# Patient Record
Sex: FEMALE | Race: WHITE | HISPANIC OR LATINO
[De-identification: names, ages, dates, MRNs, and addresses within clinical notes are randomized per-mention and may not be internally consistent; named-entity substitution may affect disease eponyms.]

---

## 2021-06-14 ENCOUNTER — APPOINTMENT (OUTPATIENT)
Dept: OBGYN | Facility: CLINIC | Age: 26
End: 2021-06-14
Payer: COMMERCIAL

## 2021-06-14 VITALS
SYSTOLIC BLOOD PRESSURE: 90 MMHG | DIASTOLIC BLOOD PRESSURE: 60 MMHG | WEIGHT: 125 LBS | TEMPERATURE: 97.7 F | HEIGHT: 61 IN | BODY MASS INDEX: 23.6 KG/M2

## 2021-06-14 DIAGNOSIS — Z11.59 ENCOUNTER FOR SCREENING FOR OTHER VIRAL DISEASES: ICD-10-CM

## 2021-06-14 DIAGNOSIS — Z30.018 ENCOUNTER FOR INITIAL PRESCRIPTION OF OTHER CONTRACEPTIVES: ICD-10-CM

## 2021-06-14 DIAGNOSIS — F90.1 ATTENTION-DEFICIT HYPERACTIVITY DISORDER, PREDOMINANTLY HYPERACTIVE TYPE: ICD-10-CM

## 2021-06-14 DIAGNOSIS — Z86.59 PERSONAL HISTORY OF OTHER MENTAL AND BEHAVIORAL DISORDERS: ICD-10-CM

## 2021-06-14 DIAGNOSIS — Z83.3 FAMILY HISTORY OF DIABETES MELLITUS: ICD-10-CM

## 2021-06-14 DIAGNOSIS — Z87.19 PERSONAL HISTORY OF OTHER DISEASES OF THE DIGESTIVE SYSTEM: ICD-10-CM

## 2021-06-14 DIAGNOSIS — Z11.4 ENCOUNTER FOR SCREENING FOR HUMAN IMMUNODEFICIENCY VIRUS [HIV]: ICD-10-CM

## 2021-06-14 DIAGNOSIS — Z82.49 FAMILY HISTORY OF ISCHEMIC HEART DISEASE AND OTHER DISEASES OF THE CIRCULATORY SYSTEM: ICD-10-CM

## 2021-06-14 DIAGNOSIS — Z78.9 OTHER SPECIFIED HEALTH STATUS: ICD-10-CM

## 2021-06-14 PROBLEM — Z00.00 ENCOUNTER FOR PREVENTIVE HEALTH EXAMINATION: Status: ACTIVE | Noted: 2021-06-14

## 2021-06-14 PROCEDURE — 36415 COLL VENOUS BLD VENIPUNCTURE: CPT

## 2021-06-14 PROCEDURE — 99385 PREV VISIT NEW AGE 18-39: CPT

## 2021-06-14 NOTE — HISTORY OF PRESENT ILLNESS
[Patient reported PAP Smear was normal] : Patient reported PAP Smear was normal [N] : Patient reports normal menses [Intravaginal Ring] : uses the intravaginal ring [Monogamous (Male Partner)] : is monogamous with a male partner [Y] : Positive pregnancy history [Menarche Age: ____] : age at menarche was [unfilled] [No] : Patient does not have concerns regarding sex [Currently Active] : currently active [Men] : men [PapSmeardate] : 1/2019 [LMPDate] : 6/04/21 [PGxTotal] : 1 [facility] [PGHxABInduced] : 1 [FreeTextEntry1] : 6/04/21

## 2021-06-14 NOTE — DISCUSSION/SUMMARY
[FreeTextEntry1] : PAP/GC Chlam/STD panel\par \par Safe sexual practices reviewed\par \par Reviewed self breast exam\par \par Discussed vulvar hygiene, advised pt to stop using Vagisil\par \par Nuva ring refilled

## 2021-06-14 NOTE — PHYSICAL EXAM
[Appropriately responsive] : appropriately responsive [Alert] : alert [No Acute Distress] : no acute distress [No Lymphadenopathy] : no lymphadenopathy [Soft] : soft [Non-tender] : non-tender [Non-distended] : non-distended [No Lesions] : no lesions [No Mass] : no mass [FreeTextEntry6] : B/L nipple piercings [Examination Of The Breasts] : a normal appearance [No Masses] : no breast masses were palpable [Labia Majora] : normal [Labia Minora] : normal [Normal] : normal [Uterine Adnexae] : normal

## 2021-06-28 ENCOUNTER — TRANSCRIPTION ENCOUNTER (OUTPATIENT)
Age: 26
End: 2021-06-28

## 2021-08-09 ENCOUNTER — TRANSCRIPTION ENCOUNTER (OUTPATIENT)
Age: 26
End: 2021-08-09

## 2021-08-09 LAB
C TRACH RRNA SPEC QL NAA+PROBE: NOT DETECTED
CYTOLOGY CVX/VAG DOC THIN PREP: ABNORMAL
HAV IGM SER QL: NONREACTIVE
HBV CORE IGM SER QL: NONREACTIVE
HBV SURFACE AG SER QL: NONREACTIVE
HCV AB SER QL: NONREACTIVE
HCV S/CO RATIO: 0.1 S/CO
HIV1+2 AB SPEC QL IA.RAPID: NONREACTIVE
HSV 1+2 IGG SER IA-IMP: NEGATIVE
HSV 1+2 IGG SER IA-IMP: POSITIVE
HSV1 IGG SER QL: 2.98 INDEX
HSV2 IGG SER QL: 0.08 INDEX
N GONORRHOEA RRNA SPEC QL NAA+PROBE: NOT DETECTED
SOURCE TP AMPLIFICATION: NORMAL
T PALLIDUM AB SER QL IA: NEGATIVE

## 2021-09-14 ENCOUNTER — NON-APPOINTMENT (OUTPATIENT)
Age: 26
End: 2021-09-14

## 2021-12-15 RX ORDER — ETONOGESTREL AND ETHINYL ESTRADIOL .12; .015 MG/D; MG/D
0.12-0.015 RING VAGINAL
Qty: 3 | Refills: 9 | Status: DISCONTINUED | COMMUNITY
Start: 2021-03-05 | End: 2021-12-15

## 2022-06-01 ENCOUNTER — APPOINTMENT (OUTPATIENT)
Dept: OBGYN | Facility: CLINIC | Age: 27
End: 2022-06-01

## 2022-10-10 ENCOUNTER — NON-APPOINTMENT (OUTPATIENT)
Age: 27
End: 2022-10-10

## 2022-11-27 ENCOUNTER — NON-APPOINTMENT (OUTPATIENT)
Age: 27
End: 2022-11-27

## 2022-12-17 ENCOUNTER — NON-APPOINTMENT (OUTPATIENT)
Age: 27
End: 2022-12-17

## 2022-12-17 ENCOUNTER — APPOINTMENT (OUTPATIENT)
Dept: OBGYN | Facility: CLINIC | Age: 27
End: 2022-12-17
Payer: SELF-PAY

## 2022-12-17 VITALS
DIASTOLIC BLOOD PRESSURE: 64 MMHG | BODY MASS INDEX: 23.79 KG/M2 | WEIGHT: 126 LBS | SYSTOLIC BLOOD PRESSURE: 92 MMHG | HEIGHT: 61 IN

## 2022-12-17 DIAGNOSIS — Z80.0 FAMILY HISTORY OF MALIGNANT NEOPLASM OF DIGESTIVE ORGANS: ICD-10-CM

## 2022-12-17 DIAGNOSIS — Z80.3 FAMILY HISTORY OF MALIGNANT NEOPLASM OF BREAST: ICD-10-CM

## 2022-12-17 DIAGNOSIS — Z12.4 ENCOUNTER FOR SCREENING FOR MALIGNANT NEOPLASM OF CERVIX: ICD-10-CM

## 2022-12-17 DIAGNOSIS — Z82.0 FAMILY HISTORY OF EPILEPSY AND OTHER DISEASES OF THE NERVOUS SYSTEM: ICD-10-CM

## 2022-12-17 LAB
HCG UR QL: NEGATIVE
QUALITY CONTROL: YES

## 2022-12-17 PROCEDURE — 81025 URINE PREGNANCY TEST: CPT

## 2022-12-17 PROCEDURE — 99395 PREV VISIT EST AGE 18-39: CPT

## 2022-12-17 RX ORDER — ESCITALOPRAM OXALATE 10 MG/1
10 TABLET ORAL
Qty: 30 | Refills: 0 | Status: DISCONTINUED | COMMUNITY
Start: 2021-02-05 | End: 2022-12-17

## 2022-12-17 RX ORDER — DEXTROAMPHETAMINE SACCHARATE, AMPHETAMINE ASPARTATE, DEXTROAMPHETAMINE SULFATE AND AMPHETAMINE SULFATE 5; 5; 5; 5 MG/1; MG/1; MG/1; MG/1
20 TABLET ORAL
Qty: 30 | Refills: 0 | Status: DISCONTINUED | COMMUNITY
Start: 2021-05-21 | End: 2022-12-17

## 2022-12-17 RX ORDER — DEXTROAMPHETAMINE SACCHARATE, AMPHETAMINE ASPARTATE, DEXTROAMPHETAMINE SULFATE, AND AMPHETAMINE SULFATE 7.5; 7.5; 7.5; 7.5 MG/1; MG/1; MG/1; MG/1
30 TABLET ORAL
Refills: 0 | Status: ACTIVE | COMMUNITY

## 2022-12-17 RX ORDER — BENZONATATE 200 MG/1
200 CAPSULE ORAL
Qty: 30 | Refills: 0 | Status: DISCONTINUED | COMMUNITY
Start: 2020-12-22 | End: 2022-12-17

## 2022-12-17 RX ORDER — DEXTROAMPHETAMINE SACCHARATE, AMPHETAMINE ASPARTATE, DEXTROAMPHETAMINE SULFATE AND AMPHETAMINE SULFATE 3.75; 3.75; 3.75; 3.75 MG/1; MG/1; MG/1; MG/1
15 TABLET ORAL
Qty: 30 | Refills: 0 | Status: DISCONTINUED | COMMUNITY
Start: 2021-04-16 | End: 2022-12-17

## 2022-12-17 RX ORDER — BUPROPION HYDROCHLORIDE 150 MG/1
150 TABLET, EXTENDED RELEASE ORAL
Qty: 30 | Refills: 0 | Status: DISCONTINUED | COMMUNITY
Start: 2021-04-27 | End: 2022-12-17

## 2022-12-19 LAB
C TRACH RRNA SPEC QL NAA+PROBE: NOT DETECTED
N GONORRHOEA RRNA SPEC QL NAA+PROBE: NOT DETECTED
SOURCE TP AMPLIFICATION: NORMAL

## 2022-12-20 ENCOUNTER — NON-APPOINTMENT (OUTPATIENT)
Age: 27
End: 2022-12-20

## 2022-12-21 ENCOUNTER — NON-APPOINTMENT (OUTPATIENT)
Age: 27
End: 2022-12-21

## 2022-12-21 RX ORDER — ETONOGESTREL AND ETHINYL ESTRADIOL .12; .015 MG/D; MG/D
0.12-0.015 INSERT, EXTENDED RELEASE VAGINAL
Qty: 1 | Refills: 3 | Status: ACTIVE | COMMUNITY
Start: 2021-12-15 | End: 1900-01-01

## 2022-12-28 NOTE — HISTORY OF PRESENT ILLNESS
[N] : Patient reports normal menses [Intravaginal Ring] : uses the intravaginal ring [Y] : Positive pregnancy history [Menarche Age: ____] : age at menarche was [unfilled] [No] : Patient does not have concerns regarding sex [TextBox_4] : romero is here for an annual exam.\par \par She has questions about genetic testing given her family history of breast cancer on her maternal side and colon cancer on her paternal side. She has polyps herself and is recommended to have a yearly colonoscopy.\par \par She is using the nuvaring and doing well- tries to use it continuously to skip her period, but had btb the entire month last month.  [Papeardate] : 06/14/21 [TextBox_31] : NEG [ColonoscopyDate] : 08/2022 [TextBox_43] : PER PT POLYPS SEEN, DUE TO GO BACK YEARLY [GonorrheaDate] : 06/14/21 [TextBox_63] : NEG [ChlamydiaDate] : 06/14/21 [TextBox_68] : NEG [LMPDate] : 12/13/22 [PGxTotal] : 1 [[facility] [PGHxABInduced] : 1 [FreeTextEntry1] : 12/13/22

## 2022-12-28 NOTE — PHYSICAL EXAM
[Appropriately responsive] : appropriately responsive [Alert] : alert [No Acute Distress] : no acute distress [Soft] : soft [Non-tender] : non-tender [Non-distended] : non-distended [No Lesions] : no lesions [No Mass] : no mass [Oriented x3] : oriented x3 [Examination Of The Breasts] : a normal appearance [No Masses] : no breast masses were palpable [Normal] : normal [Uterine Adnexae] : non-palpable [Tenderness] : nontender [Enlarged ___ wks] : not enlarged [Mass ___ cm] : no uterine mass was palpated

## 2022-12-28 NOTE — PLAN
[FreeTextEntry1] : - genetic testing recommended due to family history for breast, ovarian and colon cancer\par - pap obtained\par - continue nuvaring, recommended having a withdrawl bleed periodically to reduce btb with continuous use.\par - self breast exams/breast awareness encouraged

## 2023-01-02 LAB — CYTOLOGY CVX/VAG DOC THIN PREP: NORMAL

## 2023-01-11 ENCOUNTER — APPOINTMENT (OUTPATIENT)
Dept: OBGYN | Facility: CLINIC | Age: 28
End: 2023-01-11
Payer: COMMERCIAL

## 2023-01-11 VITALS
SYSTOLIC BLOOD PRESSURE: 110 MMHG | HEIGHT: 61 IN | WEIGHT: 126 LBS | BODY MASS INDEX: 23.79 KG/M2 | TEMPERATURE: 97 F | DIASTOLIC BLOOD PRESSURE: 70 MMHG

## 2023-01-11 DIAGNOSIS — Z80.49 FAMILY HISTORY OF MALIGNANT NEOPLASM OF OTHER GENITAL ORGANS: ICD-10-CM

## 2023-01-11 DIAGNOSIS — Z80.0 FAMILY HISTORY OF MALIGNANT NEOPLASM OF DIGESTIVE ORGANS: ICD-10-CM

## 2023-01-11 DIAGNOSIS — Z13.79 ENCOUNTER FOR OTHER SCREENING FOR GENETIC AND CHROMOSOMAL ANOMALIES: ICD-10-CM

## 2023-01-11 DIAGNOSIS — Z80.41 FAMILY HISTORY OF MALIGNANT NEOPLASM OF OVARY: ICD-10-CM

## 2023-01-11 PROCEDURE — 36415 COLL VENOUS BLD VENIPUNCTURE: CPT

## 2023-01-11 PROCEDURE — 99213 OFFICE O/P EST LOW 20 MIN: CPT

## 2023-01-13 ENCOUNTER — TRANSCRIPTION ENCOUNTER (OUTPATIENT)
Age: 28
End: 2023-01-13

## 2023-01-13 PROBLEM — Z80.41 FAMILY HISTORY OF MALIGNANT NEOPLASM OF OVARY: Status: ACTIVE | Noted: 2021-06-14

## 2023-01-13 LAB
HSV 1+2 IGG SER IA-IMP: POSITIVE
HSV 1+2 IGG SER IA-IMP: POSITIVE
HSV1 IGG SER QL: 8.95 INDEX
HSV2 IGG SER QL: 3.56 INDEX

## 2023-01-13 NOTE — PLAN
[FreeTextEntry1] : - we updated her family history\par - she is interested in having the comprehensive genetic panel completed today- labs drawn in office\par - we discussed prior + HSV serology testing- will repeat today.  \par - pt to follow up in office in 2 weeks for results of above testing.\par \par \par ***Addendum- pt send me a portal message after her visit stating her mother and aunt ARE BRCA positive.  Chart updated.

## 2023-01-13 NOTE — PHYSICAL EXAM
[Appropriately responsive] : appropriately responsive [Alert] : alert [No Acute Distress] : no acute distress [Oriented x3] : oriented x3 [No Bleeding] : There was no active vaginal bleeding

## 2023-01-13 NOTE — HISTORY OF PRESENT ILLNESS
[Intravaginal Ring] : uses the intravaginal ring [Y] : Positive pregnancy history [Menarche Age: ____] : age at menarche was [unfilled] [No] : Patient does not have concerns regarding sex [Currently Active] : currently active [TextBox_4] : Yoselyn is here to have genetic testing completed relating to her family history of cancer.  \par she has multiple family members with cancer.  She states her mother tested negative for the BRCA mutation.\par \par **Addendum- pt messaged via the portal and discovered her mother and aunt DO have a BRCA mutation** [PapSmeardate] : 12/17/22 [TextBox_31] : NEG [GonorrheaDate] : 12/17/22 [TextBox_63] : NEG [ChlamydiaDate] : 12/17/22 [TextBox_68] : NEG [LMPDate] : 01/07/22 [PGxTotal] : 1 [[facility] [PGHxABInduced] : 1 [FreeTextEntry1] : 01/07/22

## 2023-01-14 ENCOUNTER — TRANSCRIPTION ENCOUNTER (OUTPATIENT)
Age: 28
End: 2023-01-14

## 2023-01-25 ENCOUNTER — APPOINTMENT (OUTPATIENT)
Dept: OBGYN | Facility: CLINIC | Age: 28
End: 2023-01-25
Payer: COMMERCIAL

## 2023-01-25 VITALS
SYSTOLIC BLOOD PRESSURE: 110 MMHG | DIASTOLIC BLOOD PRESSURE: 70 MMHG | WEIGHT: 126 LBS | HEIGHT: 61 IN | BODY MASS INDEX: 23.79 KG/M2 | TEMPERATURE: 97 F

## 2023-01-25 DIAGNOSIS — Z71.2 PERSON CONSULTING FOR EXPLANATION OF EXAMINATION OR TEST FINDINGS: ICD-10-CM

## 2023-01-25 PROCEDURE — 99212 OFFICE O/P EST SF 10 MIN: CPT

## 2023-01-25 NOTE — PLAN
[FreeTextEntry1] : We discussed the nature of HSV at length, including acquisition, transmission and recurrence.  Specifically, she was advised to avoid contact during active signs and symptoms of an outbreak, including the prodrome.  We reviewed use of Valtrex prn vs suppressive therapy.  Consistent condom use was encouraged.\par \par - I will call her when her genetic results come in and she would like to come in in person to review the results.

## 2023-01-25 NOTE — HISTORY OF PRESENT ILLNESS
[Intravaginal Ring] : uses the intravaginal ring [Y] : Positive pregnancy history [Menarche Age: ____] : age at menarche was [unfilled] [No] : Patient does not have concerns regarding sex [Currently Active] : currently active [TextBox_4] : Yoselyn is here for resutls of her genetic testing.  Unfortunately the results have not come in yet. \par \par She received the results from her colonoscopy- she had precancerous polyps and will need a colonoscopy every year.\par \par She would also like to review  her HSV labs.   [PapSmeardate] : 12/17/22 [TextBox_31] : NEG [GonorrheaDate] : 12/17/22 [TextBox_63] : NEG [ChlamydiaDate] : 12/17/22 [TextBox_68] : NEG [LMPDate] : 01/07/23 [PGxTotal] : 1 [PGHxABInduced] : 1 [FreeTextEntry1] : 01/07/23

## 2023-03-22 ENCOUNTER — TRANSCRIPTION ENCOUNTER (OUTPATIENT)
Age: 28
End: 2023-03-22

## 2023-03-24 ENCOUNTER — TRANSCRIPTION ENCOUNTER (OUTPATIENT)
Age: 28
End: 2023-03-24

## 2023-03-27 LAB — COMPREHENSIVE CANCER PANEL: NEGATIVE

## 2023-12-04 ENCOUNTER — RX RENEWAL (OUTPATIENT)
Age: 28
End: 2023-12-04

## 2023-12-20 ENCOUNTER — APPOINTMENT (OUTPATIENT)
Dept: OBGYN | Facility: CLINIC | Age: 28
End: 2023-12-20
Payer: COMMERCIAL

## 2023-12-20 VITALS
HEIGHT: 61 IN | BODY MASS INDEX: 27.03 KG/M2 | DIASTOLIC BLOOD PRESSURE: 68 MMHG | WEIGHT: 143.2 LBS | SYSTOLIC BLOOD PRESSURE: 116 MMHG

## 2023-12-20 DIAGNOSIS — Z01.419 ENCOUNTER FOR GYNECOLOGICAL EXAMINATION (GENERAL) (ROUTINE) W/OUT ABNORMAL FINDINGS: ICD-10-CM

## 2023-12-20 DIAGNOSIS — Z30.9 ENCOUNTER FOR CONTRACEPTIVE MANAGEMENT, UNSPECIFIED: ICD-10-CM

## 2023-12-20 DIAGNOSIS — Z11.3 ENCOUNTER FOR SCREENING FOR INFECTIONS WITH A PREDOMINANTLY SEXUAL MODE OF TRANSMISSION: ICD-10-CM

## 2023-12-20 LAB
HCG UR QL: NEGATIVE
QUALITY CONTROL: YES

## 2023-12-20 PROCEDURE — 99395 PREV VISIT EST AGE 18-39: CPT

## 2023-12-20 PROCEDURE — 81025 URINE PREGNANCY TEST: CPT

## 2023-12-20 RX ORDER — ALPRAZOLAM 2 MG/1
TABLET ORAL
Refills: 0 | Status: ACTIVE | COMMUNITY

## 2023-12-20 RX ORDER — ERGOCALCIFEROL 1.25 MG/1
1.25 MG CAPSULE, LIQUID FILLED ORAL
Qty: 12 | Refills: 0 | Status: DISCONTINUED | COMMUNITY
Start: 2021-02-10 | End: 2023-12-20

## 2023-12-20 RX ORDER — ETONOGESTREL AND ETHINYL ESTRADIOL .12; .015 MG/D; MG/D
0.12-0.015 RING VAGINAL
Qty: 3 | Refills: 3 | Status: ACTIVE | COMMUNITY
Start: 2022-12-21 | End: 1900-01-01

## 2023-12-20 NOTE — HISTORY OF PRESENT ILLNESS
[Menarche Age: ____] : age at menarche was [unfilled] [No] : Patient does not have concerns regarding sex [Y] : Patient is sexually active [Currently Active] : currently active [Intravaginal Ring] : uses the intravaginal ring [TextBox_4] : Yoselyn is here for an annual exam. She denies gyn complaints today.  She had genetic testing completed due to FH of a BRCA mutation- her genetic testing was negative.  she is using the nuvaring for contraception and doing well. [PapSmeardate] : 12/17/22 [GonorrheaDate] : 12/17/22 [TextBox_31] : NEG [TextBox_63] : NEG [ChlamydiaDate] : 12/17/22 [TextBox_68] : NEG [LMPDate] : 01/07/23 [PGxTotal] : 1 [PGHxABInduced] : 1 [FreeTextEntry1] : 12/6/23

## 2023-12-20 NOTE — PHYSICAL EXAM
[Chaperone Present] : A chaperone was present in the examining room during all aspects of the physical examination [Appropriately responsive] : appropriately responsive [Alert] : alert [No Acute Distress] : no acute distress [Soft] : soft [Non-tender] : non-tender [Non-distended] : non-distended [No HSM] : No HSM [No Lesions] : no lesions [No Mass] : no mass [Oriented x3] : oriented x3 [FreeTextEntry1] : ANAHY Boswell [Examination Of The Breasts] : a normal appearance [No Masses] : no breast masses were palpable [Labia Majora] : normal [Labia Minora] : normal [Uterine Adnexae] : normal [Normal] : normal [FreeTextEntry4] : nuvaring in place

## 2023-12-20 NOTE — COUNSELING
[Nutrition/ Exercise/ Weight Management] : nutrition, exercise, weight management [Vitamins/Supplements] : vitamins/supplements [Breast Self Exam] : breast self exam [Contraception/ Emergency Contraception/ Safe Sexual Practices] : contraception, emergency contraception, safe sexual practices [HPV Vaccine] : HPV Vaccine

## 2023-12-22 LAB
C TRACH RRNA SPEC QL NAA+PROBE: NOT DETECTED
N GONORRHOEA RRNA SPEC QL NAA+PROBE: NOT DETECTED
SOURCE AMPLIFICATION: NORMAL

## 2024-01-08 ENCOUNTER — APPOINTMENT (OUTPATIENT)
Dept: OBGYN | Facility: CLINIC | Age: 29
End: 2024-01-08
Payer: COMMERCIAL

## 2024-01-08 VITALS
WEIGHT: 143 LBS | DIASTOLIC BLOOD PRESSURE: 70 MMHG | HEIGHT: 61 IN | SYSTOLIC BLOOD PRESSURE: 112 MMHG | TEMPERATURE: 98 F | BODY MASS INDEX: 27 KG/M2

## 2024-01-08 DIAGNOSIS — Z71.1 PERSON WITH FEARED HEALTH COMPLAINT IN WHOM NO DIAGNOSIS IS MADE: ICD-10-CM

## 2024-01-08 PROCEDURE — 99213 OFFICE O/P EST LOW 20 MIN: CPT

## 2024-01-16 PROBLEM — Z71.1 WORRIED WELL: Status: ACTIVE | Noted: 2024-01-16

## 2024-01-16 NOTE — PLAN
[FreeTextEntry1] : Pt reassured nuva ring is not in vagina- pt appreciative - she  has no other concerns- back up birth control discussed -as well as safe sex practices

## 2024-01-16 NOTE — HISTORY OF PRESENT ILLNESS
[Intravaginal Ring] : uses the intravaginal ring [Y] : Positive pregnancy history [Menarche Age: ____] : age at menarche was [unfilled] [No] : Patient does not have concerns regarding sex [Currently Active] : currently active [Men] : men [PapSmeardate] : 12/17/22 [TextBox_31] : NEG [GonorrheaDate] : 12/02/23 [TextBox_63] : NEG [ChlamydiaDate] : 12/02/23 [TextBox_68] : NEG [LMPDate] : 12/26/23 [PGxTotal] : 1 [PGHxABInduced] : 1 [FreeTextEntry1] : 12/26/23

## 2024-06-28 ENCOUNTER — RESULT REVIEW (OUTPATIENT)
Age: 29
End: 2024-06-28

## 2024-06-28 ENCOUNTER — APPOINTMENT (OUTPATIENT)
Dept: ORTHOPEDIC SURGERY | Facility: CLINIC | Age: 29
End: 2024-06-28
Payer: COMMERCIAL

## 2024-06-28 VITALS — BODY MASS INDEX: 25.52 KG/M2 | HEIGHT: 60 IN | WEIGHT: 130 LBS

## 2024-06-28 DIAGNOSIS — M25.511 PAIN IN RIGHT SHOULDER: ICD-10-CM

## 2024-06-28 PROCEDURE — 99204 OFFICE O/P NEW MOD 45 MIN: CPT

## 2025-01-08 ENCOUNTER — APPOINTMENT (OUTPATIENT)
Dept: OBGYN | Facility: CLINIC | Age: 30
End: 2025-01-08

## 2025-01-22 ENCOUNTER — TRANSCRIPTION ENCOUNTER (OUTPATIENT)
Age: 30
End: 2025-01-22

## 2025-01-27 ENCOUNTER — TRANSCRIPTION ENCOUNTER (OUTPATIENT)
Age: 30
End: 2025-01-27

## 2025-02-03 ENCOUNTER — TRANSCRIPTION ENCOUNTER (OUTPATIENT)
Age: 30
End: 2025-02-03

## 2025-02-06 ENCOUNTER — APPOINTMENT (OUTPATIENT)
Dept: INTERNAL MEDICINE | Facility: CLINIC | Age: 30
End: 2025-02-06

## 2025-02-07 ENCOUNTER — NON-APPOINTMENT (OUTPATIENT)
Age: 30
End: 2025-02-07

## 2025-02-18 ENCOUNTER — APPOINTMENT (OUTPATIENT)
Dept: OBGYN | Facility: CLINIC | Age: 30
End: 2025-02-18

## 2025-03-26 ENCOUNTER — RX RENEWAL (OUTPATIENT)
Age: 30
End: 2025-03-26

## 2025-05-02 ENCOUNTER — NON-APPOINTMENT (OUTPATIENT)
Age: 30
End: 2025-05-02

## 2025-07-07 ENCOUNTER — NON-APPOINTMENT (OUTPATIENT)
Age: 30
End: 2025-07-07